# Patient Record
Sex: FEMALE | Race: WHITE | NOT HISPANIC OR LATINO | URBAN - METROPOLITAN AREA
[De-identification: names, ages, dates, MRNs, and addresses within clinical notes are randomized per-mention and may not be internally consistent; named-entity substitution may affect disease eponyms.]

---

## 2023-11-19 ENCOUNTER — OFFICE VISIT (OUTPATIENT)
Age: 58
End: 2023-11-19
Payer: COMMERCIAL

## 2023-11-19 VITALS
OXYGEN SATURATION: 97 % | TEMPERATURE: 97.1 F | WEIGHT: 144 LBS | SYSTOLIC BLOOD PRESSURE: 128 MMHG | DIASTOLIC BLOOD PRESSURE: 70 MMHG | RESPIRATION RATE: 16 BRPM | HEART RATE: 82 BPM
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
## 2023-11-19 PROCEDURE — 99204 OFFICE O/P NEW MOD 45 MIN: CPT | Performed by: PHYSICIAN ASSISTANT

## 2023-11-19 RX ORDER — FAMOTIDINE 20 MG/1
20 TABLET, FILM COATED ORAL 2 TIMES DAILY
COMMUNITY

## 2023-11-19 RX ORDER — DOXYCYCLINE HYCLATE 100 MG/1
100 CAPSULE ORAL EVERY 12 HOURS SCHEDULED
Qty: 14 CAPSULE | Refills: 0 | Status: SHIPPED | OUTPATIENT
Start: 2023-11-19 | End: 2023-11-26

## 2023-11-19 NOTE — PROGRESS NOTES
[facility]        NAME: Alphonse Lennox is a 60 y.o. female  : 1965    MRN: [id number]  DATE: 2023  TIME: 9:25 AM    Assessment and Plan   Infected tick bite, initial encounter [W57. XXXA]  1. Infected tick bite, initial encounter  doxycycline hyclate (VIBRAMYCIN) 100 mg capsule            Patient Instructions     Warm compresses locally for 15 minutes 3 times daily  Apply bacitracin ointment after every warm compress  Doxycycline as directed -possible side effects of doxycycline discussed with patient who expressed verbal understanding. Follow up with PCP in 3-5 days. Proceed to  ER if symptoms worsen. Chief Complaint     Chief Complaint   Patient presents with    Tick Removal     Pt states on Friday pt felt a pain under her right arm and noticed a tick bit her. Pt states she believes she got the tick out          History of Present Illness       62-year-old female is presenting today with report of discovering a tick embedded in the right axillary this past Friday, 2 days ago, after gardening on the same day. The patient reports she manipulated the area and was able to remove the tick however, believes that a portion of the tick is embedded still in her area. Review of Systems   Review of Systems   Constitutional:  Negative for activity change, appetite change, fatigue and fever. Gastrointestinal:  Negative for nausea. Skin:  Positive for rash. Neurological:  Negative for headaches.          Current Medications       Current Outpatient Medications:     doxycycline hyclate (VIBRAMYCIN) 100 mg capsule, Take 1 capsule (100 mg total) by mouth every 12 (twelve) hours for 7 days, Disp: 14 capsule, Rfl: 0    famotidine (PEPCID) 20 mg tablet, Take 20 mg by mouth 2 (two) times a day, Disp: , Rfl:     Current Allergies     Allergies as of 2023 - Reviewed 2023   Allergen Reaction Noted    Pollen extract Sneezing 2023            The following portions of the patient's history were reviewed and updated as appropriate: allergies, current medications, past family history, past medical history, past social history, past surgical history and problem list.     History reviewed. No pertinent past medical history. History reviewed. No pertinent surgical history. History reviewed. No pertinent family history. Medications have been verified. Objective   /70   Pulse 82   Temp (!) 97.1 °F (36.2 °C)   Resp 16   Wt 65.3 kg (144 lb)   SpO2 97%        Physical Exam     Physical Exam  Vitals and nursing note reviewed. Constitutional:       General: She is not in acute distress. Appearance: Normal appearance. She is not ill-appearing. Eyes:      General: No scleral icterus. Extraocular Movements: Extraocular movements intact. Conjunctiva/sclera: Conjunctivae normal.      Pupils: Pupils are equal, round, and reactive to light. Cardiovascular:      Rate and Rhythm: Normal rate and regular rhythm. Pulses: Normal pulses. Pulmonary:      Effort: Pulmonary effort is normal. No respiratory distress. Musculoskeletal:         General: Normal range of motion. Cervical back: Normal range of motion and neck supple. No tenderness. Lymphadenopathy:      Cervical: No cervical adenopathy. Skin:     Comments: Partial tick embedded in the right axillary surrounded with a 7 mm erythematous single halo. Neurological:      Mental Status: She is alert and oriented to person, place, and time. Coordination: Coordination normal.      Gait: Gait normal.   Psychiatric:         Mood and Affect: Mood normal.         Behavior: Behavior normal.         Universal Protocol:  Consent: Verbal consent obtained.   Risks and benefits: risks, benefits and alternatives were discussed  Consent given by: patient  Time out: Immediately prior to procedure a "time out" was called to verify the correct patient, procedure, equipment, support staff and site/side marked as required. Timeout called at: 11/19/2023 9:20 AM.  Patient understanding: patient states understanding of the procedure being performed  Patient consent: the patient's understanding of the procedure matches consent given  Procedure consent: procedure consent matches procedure scheduled  Relevant documents: relevant documents present and verified  Site marked: the operative site was marked  Patient identity confirmed: verbally with patient  Foreign body removal    Date/Time: 11/19/2023 8:30 AM    Performed by: Herminia Crowder PA-C  Authorized by: Herminia Crowder PA-C  Intake: right axillae. Complexity: simple (Remove foreign body with sterile tweezers. )  1 objects recovered. Objects recovered: face/jaw of tick  Post-procedure assessment: foreign body removed  Patient tolerance: patient tolerated the procedure well with no immediate complications  Comments: Apply bacitracin ointment and covered with a 2 x 2 gauze. Aftercare instructions provided and patient expressed verbal understanding.     \

## 2023-11-19 NOTE — PATIENT INSTRUCTIONS
Tick Bite   WHAT YOU NEED TO KNOW:   Ticks need to be removed quickly. Most tick bites are not dangerous, but ticks can pass disease or infection when they bite. Diseases include Lyme disease, babesiosis, tularemia, and [place] Spotted Fever. Signs and symptoms may develop weeks or even months after a bite. Some may happen right away, such as redness, pain, itching, and swelling near the bite area. Watch for a fever, rash, body aches, or breathing problems. These may be symptoms of a serious disease that needs to be treated quickly. DISCHARGE INSTRUCTIONS:   Return to the emergency department if:   You have trouble walking or moving your legs. You have joint pain, muscle pain, or muscle weakness within 1 month of a tick bite. You have a fever, chills, headache, or rash. Call your doctor if:   You cannot remove the tick. The tick's head is stuck in your skin. You have questions or concerns about your condition or care. Medicines:   Medicines  help decrease pain, redness, itching, and swelling. You may also need medicine to prevent or fight a bacterial infection. These medicines may be given as a cream, lotion, or pill. Take your medicine as directed. Contact your healthcare provider if you think your medicine is not helping or if you have side effects. Tell your provider if you are allergic to any medicine. Keep a list of the medicines, vitamins, and herbs you take. Include the amounts, and when and why you take them. Bring the list or the pill bottles to follow-up visits. Carry your medicine list with you in case of an emergency. How to remove a tick:  Remove the tick as soon as possible to help prevent disease or infection. You are less likely to get sick from a tick bite if you remove the tick within 24 hours. Do not use petroleum jelly, nail polish, rubbing alcohol, or heat. These do not work and may be dangerous. Do the following to remove a tick:  First, try a soapy cotton ball. Soak a cotton ball in liquid soap. Cover the tick with the cotton ball for 30 seconds. The tick may come off with the cotton ball when you pull it away. Use tweezers if the soapy cotton ball does not work. Grasp the tick as close to your skin as possible. Pull the tick straight up and out. Do not touch the tick with your bare hands. Do not twist or jerk the tick suddenly, because this may break off the tick's head or mouth parts. Do not leave any part of the tick in your skin. Do not crush or squeeze the tick since its body may be infected with germs. Flush the tick down the toilet. After the tick is removed, clean the area of the bite with rubbing alcohol. Then wash your hands with soap and water. Apply ice  on your bite for 15 to 20 minutes every hour or as directed. Use an ice pack, or put crushed ice in a plastic bag. Cover it with a towel before you apply it to your skin. Ice helps prevent tissue damage and decreases swelling and pain. Prevent a tick bite:  Ticks live in areas covered by brush and grass. They may even be found in your lawn if you live in certain areas. Outdoor pets can carry ticks inside the house. Ticks can grab onto you or your clothes when you walk by grass or brush. If you go into areas that contain many trees, tall grasses, and underbrush, do the following:  Wear light colored pants and a long-sleeved shirt. Tuck your pants into your socks or boots. Tuck in your shirt. Wear sleeves that fit close to the skin at your wrists and neck. This will help prevent ticks from crawling through gaps in your clothing and onto your skin. Wear a hat in areas with trees. Apply insect repellant on your skin. The insect repellant should contain DEET. Do not put insect repellant on skin that is cut, scratched, or irritated. Always use soap and water to wash the insect repellant off as soon as possible once you are indoors.  Do not apply insect repellant on your child's face or hands.    Spray insect repellant onto your clothes. Use permethrin spray. This spray kills ticks that crawl on your clothing. Be sure to spray the tops of your boots, bottom of pant legs, and sleeve cuffs. As soon as possible, wash and dry clothing in hot water and high heat. Check your clothing, hair, and skin for ticks. Shower within 2 hours of coming indoors. Carefully check the hairline, armpits, neck, and waist. Check your pets and children for ticks. Remove ticks from pets the same way as you remove them from people. Decrease the risk for ticks in your yard. Ticks like to live in shady, moist areas. Carol Horde your lawn regularly to keep the grass short. Trim the grass around birdbaths and fences. Cut branches that are overgrown and take them out of the yard. Clear out leaf piles. Albert Alexi firewood in a dry, charlie area. Follow up with your doctor as directed:  Write down your questions so you remember to ask them during your visits. © Copyright [employer] 2023 Information is for End User's use only and may not be sold, redistributed or otherwise used for commercial purposes. The above information is an  only. It is not intended as medical advice for individual conditions or treatments. Talk to your doctor, nurse or pharmacist before following any medical regimen to see if it is safe and effective for you.

## 2025-07-21 RX ORDER — LIFITEGRAST 50 MG/ML
SOLUTION/ DROPS OPHTHALMIC
COMMUNITY
Start: 2019-01-01

## 2025-07-25 ENCOUNTER — OFFICE VISIT (OUTPATIENT)
Dept: GASTROENTEROLOGY | Facility: CLINIC | Age: 60
End: 2025-07-25
Payer: COMMERCIAL

## 2025-07-25 VITALS
HEIGHT: 63 IN | OXYGEN SATURATION: 98 % | TEMPERATURE: 97.8 F | HEART RATE: 74 BPM | DIASTOLIC BLOOD PRESSURE: 80 MMHG | WEIGHT: 143 LBS | BODY MASS INDEX: 25.34 KG/M2 | SYSTOLIC BLOOD PRESSURE: 130 MMHG

## 2025-07-25 DIAGNOSIS — Z12.11 SCREENING FOR COLON CANCER: Primary | ICD-10-CM

## 2025-07-25 PROCEDURE — 99203 OFFICE O/P NEW LOW 30 MIN: CPT | Performed by: INTERNAL MEDICINE

## 2025-07-25 RX ORDER — PROGESTERONE 200 MG/1
1 CAPSULE ORAL DAILY
COMMUNITY
Start: 2025-06-10

## 2025-07-25 RX ORDER — ESTRADIOL 1 MG/1
1 TABLET ORAL DAILY
COMMUNITY
Start: 2025-06-09

## 2025-07-25 NOTE — PATIENT INSTRUCTIONS
Scheduled date of colonoscopy (as of today):8/5/2025  Physician performing colonoscopy:Dr Franks  Location of colonoscopy:[place]  Bowel prep reviewed with patient:Miralax/Dulcolax  Instructions reviewed with patient by:Brandy TATE  Clearances:    
Numbers 0-10

## 2025-07-25 NOTE — PROGRESS NOTES
"Name: Phoebe Shepard      : 1965      MRN: [id number]  Encounter Provider: Albert Franks DO  Encounter Date: 2025   Encounter department: [facility] [place]    :  Assessment & Plan  Screening for colon cancer    Orders:    Colonoscopy; Future      Assessment & Plan  1. Constipation:  - Daily prune consumption effectively manages symptoms.  - No current issues with bloating, gassiness, rectal bleeding, heartburn, or difficulty swallowing.  - Schedule colonoscopy at the local hospital's endoscopy unit.  - Provide instructions for bowel preparation.      History of Present Illness     History of Present Illness  The patient presents for constipation.    She reports no family history of colon cancer or polyps. Bowel movements are generally regular, with no current issues of diarrhea or constipation. There is a lifelong history of chronic constipation, which she initially considered normal. In her 40s, she realized this was not the case and attempted to alleviate it through dietary changes and increased fiber intake, but these measures were only partially successful. She has found that daily consumption of prunes effectively regulates her bowel movements. She is not experiencing bloating or gas at present, although she has had issues with gas in the past. She reports no rectal bleeding, heartburn, or difficulty swallowing.     SOCIAL HISTORY  Diet: Consumes prunes daily.    FAMILY HISTORY  - Negative for colon cancer  - Negative for colon polyps  - Mother: Condition where food gets stuck in throat, cannot eat meat, ongoing since early years       Objective   /80 (BP Location: Right arm, Patient Position: Sitting, Cuff Size: Standard)   Pulse 74   Temp 97.8 °F (36.6 °C) (Temporal)   Ht 5' 3\" (1.6 m)   Wt 64.9 kg (143 lb)   SpO2 98%   BMI 25.33 kg/m²     Physical Exam  Lungs: Clear to auscultation bilaterally.  Abdomen: No tenderness on palpation.  Physical " Exam  Vitals reviewed.   Constitutional:       General: She is not in acute distress.     Appearance: Normal appearance. She is not ill-appearing.     Eyes:      General: No scleral icterus.     Extraocular Movements: Extraocular movements intact.      Pupils: Pupils are equal, round, and reactive to light.       Cardiovascular:      Rate and Rhythm: Normal rate and regular rhythm.      Pulses: Normal pulses.      Heart sounds: Normal heart sounds. No murmur heard.  Pulmonary:      Effort: Pulmonary effort is normal.      Breath sounds: Normal breath sounds. No wheezing, rhonchi or rales.   Abdominal:      Palpations: Abdomen is soft. There is no mass.      Tenderness: There is no abdominal tenderness. There is no guarding or rebound.     Musculoskeletal:      Right lower leg: No edema.      Left lower leg: No edema.     Skin:     General: Skin is warm and dry.      Coloration: Skin is not jaundiced.      Findings: No rash.     Neurological:      General: No focal deficit present.      Mental Status: She is alert and oriented to person, place, and time.     Psychiatric:         Mood and Affect: Mood normal.         Behavior: Behavior normal.

## 2025-08-05 ENCOUNTER — ANESTHESIA EVENT (OUTPATIENT)
Dept: GASTROENTEROLOGY | Facility: HOSPITAL | Age: 60
End: 2025-08-05
Payer: COMMERCIAL

## 2025-08-05 ENCOUNTER — ANESTHESIA (OUTPATIENT)
Dept: GASTROENTEROLOGY | Facility: HOSPITAL | Age: 60
End: 2025-08-05
Payer: COMMERCIAL

## 2025-08-05 ENCOUNTER — HOSPITAL ENCOUNTER (OUTPATIENT)
Dept: GASTROENTEROLOGY | Facility: HOSPITAL | Age: 60
Setting detail: OUTPATIENT SURGERY
Discharge: HOME | End: 2025-08-05
Attending: INTERNAL MEDICINE
Payer: COMMERCIAL

## 2025-08-05 VITALS
HEIGHT: 63 IN | TEMPERATURE: 97.7 F | RESPIRATION RATE: 16 BRPM | DIASTOLIC BLOOD PRESSURE: 68 MMHG | HEART RATE: 57 BPM | SYSTOLIC BLOOD PRESSURE: 132 MMHG | BODY MASS INDEX: 24.84 KG/M2 | OXYGEN SATURATION: 100 % | WEIGHT: 140.21 LBS

## 2025-08-05 DIAGNOSIS — Z12.11 SCREENING FOR COLON CANCER: ICD-10-CM

## 2025-08-05 PROBLEM — J30.2 SEASONAL ALLERGIES: Status: ACTIVE | Noted: 2025-08-05

## 2025-08-05 PROBLEM — K21.9 GASTROESOPHAGEAL REFLUX DISEASE: Status: ACTIVE | Noted: 2025-08-05

## 2025-08-05 PROCEDURE — G0121 COLON CA SCRN NOT HI RSK IND: HCPCS | Performed by: INTERNAL MEDICINE

## 2025-08-05 RX ORDER — PROPOFOL 10 MG/ML
INJECTION, EMULSION INTRAVENOUS AS NEEDED
Status: DISCONTINUED | OUTPATIENT
Start: 2025-08-05 | End: 2025-08-05

## 2025-08-05 RX ORDER — LIDOCAINE HYDROCHLORIDE 10 MG/ML
INJECTION, SOLUTION EPIDURAL; INFILTRATION; INTRACAUDAL; PERINEURAL AS NEEDED
Status: DISCONTINUED | OUTPATIENT
Start: 2025-08-05 | End: 2025-08-05

## 2025-08-05 RX ORDER — SODIUM CHLORIDE, SODIUM LACTATE, POTASSIUM CHLORIDE, CALCIUM CHLORIDE 600; 310; 30; 20 MG/100ML; MG/100ML; MG/100ML; MG/100ML
INJECTION, SOLUTION INTRAVENOUS CONTINUOUS PRN
Status: DISCONTINUED | OUTPATIENT
Start: 2025-08-05 | End: 2025-08-05

## 2025-08-05 RX ADMIN — PROPOFOL 50 MG: 10 INJECTION, EMULSION INTRAVENOUS at 11:38

## 2025-08-05 RX ADMIN — PROPOFOL 100 MG: 10 INJECTION, EMULSION INTRAVENOUS at 11:32

## 2025-08-05 RX ADMIN — SODIUM CHLORIDE, SODIUM LACTATE, POTASSIUM CHLORIDE, AND CALCIUM CHLORIDE: .6; .31; .03; .02 INJECTION, SOLUTION INTRAVENOUS at 11:00

## 2025-08-05 RX ADMIN — LIDOCAINE HYDROCHLORIDE 50 MG: 10 INJECTION, SOLUTION EPIDURAL; INFILTRATION; INTRACAUDAL at 11:32

## 2025-08-05 RX ADMIN — PROPOFOL 20 MG: 10 INJECTION, EMULSION INTRAVENOUS at 11:44

## 2025-08-05 RX ADMIN — PROPOFOL 50 MG: 10 INJECTION, EMULSION INTRAVENOUS at 11:41

## 2025-08-05 RX ADMIN — PROPOFOL 50 MG: 10 INJECTION, EMULSION INTRAVENOUS at 11:35
